# Patient Record
Sex: FEMALE | Race: WHITE | NOT HISPANIC OR LATINO
[De-identification: names, ages, dates, MRNs, and addresses within clinical notes are randomized per-mention and may not be internally consistent; named-entity substitution may affect disease eponyms.]

---

## 2019-08-26 ENCOUNTER — TRANSCRIPTION ENCOUNTER (OUTPATIENT)
Age: 74
End: 2019-08-26

## 2019-08-27 ENCOUNTER — OUTPATIENT (OUTPATIENT)
Dept: OUTPATIENT SERVICES | Facility: HOSPITAL | Age: 74
LOS: 1 days | End: 2019-08-27
Payer: MEDICARE

## 2019-08-27 VITALS
RESPIRATION RATE: 20 BRPM | OXYGEN SATURATION: 99 % | SYSTOLIC BLOOD PRESSURE: 154 MMHG | HEART RATE: 87 BPM | DIASTOLIC BLOOD PRESSURE: 78 MMHG

## 2019-08-27 VITALS
TEMPERATURE: 98 F | DIASTOLIC BLOOD PRESSURE: 84 MMHG | HEIGHT: 64.5 IN | SYSTOLIC BLOOD PRESSURE: 161 MMHG | OXYGEN SATURATION: 98 % | HEART RATE: 66 BPM | RESPIRATION RATE: 20 BRPM | WEIGHT: 153.22 LBS

## 2019-08-27 DIAGNOSIS — H25.12 AGE-RELATED NUCLEAR CATARACT, LEFT EYE: ICD-10-CM

## 2019-08-27 DIAGNOSIS — Z98.890 OTHER SPECIFIED POSTPROCEDURAL STATES: Chronic | ICD-10-CM

## 2019-08-27 DIAGNOSIS — Z90.49 ACQUIRED ABSENCE OF OTHER SPECIFIED PARTS OF DIGESTIVE TRACT: Chronic | ICD-10-CM

## 2019-08-27 DIAGNOSIS — Z87.74 PERSONAL HISTORY OF (CORRECTED) CONGENITAL MALFORMATIONS OF HEART AND CIRCULATORY SYSTEM: Chronic | ICD-10-CM

## 2019-08-27 DIAGNOSIS — Z86.018 PERSONAL HISTORY OF OTHER BENIGN NEOPLASM: Chronic | ICD-10-CM

## 2019-08-27 PROCEDURE — V2788: CPT

## 2019-08-27 PROCEDURE — 66984 XCAPSL CTRC RMVL W/O ECP: CPT | Mod: LT

## 2019-08-27 NOTE — ASU DISCHARGE PLAN (ADULT/PEDIATRIC) - CARE PROVIDER_API CALL
Shaan Hope)  Ophthalmology  [address] [place] [address]  Phone: [phone]  Fax: [phone]  Follow Up Time:

## 2019-08-27 NOTE — ASU DISCHARGE PLAN (ADULT/PEDIATRIC) - PATIENT EDUCATION MATERIALS PROVIED
Other (specify)/Implant card (specify)/Intraocular lens implant(IOL), Eye shield with instructions , sunglasses and eye kit given to patient.

## 2019-08-27 NOTE — ASU DISCHARGE PLAN (ADULT/PEDIATRIC) - CALL YOUR DOCTOR IF YOU HAVE ANY OF THE FOLLOWING:
Fever greater than (need to indicate Fahrenheit or Celsius)/Swelling that gets worse/Bleeding that does not stop/Pain not relieved by Medications/Nausea and vomiting that does not stop

## 2019-08-27 NOTE — ASU PATIENT PROFILE, ADULT - PSH
H/O atrial septal defect repair    H/O cardiac catheterization    History of benign breast tumor    History of liver excision    S/P thyroid biopsy

## 2019-09-06 PROBLEM — E78.5 HYPERLIPIDEMIA, UNSPECIFIED: Chronic | Status: ACTIVE | Noted: 2019-08-27

## 2019-09-06 PROBLEM — I10 ESSENTIAL (PRIMARY) HYPERTENSION: Chronic | Status: ACTIVE | Noted: 2019-08-27

## 2019-09-06 PROBLEM — D37.6 NEOPLASM OF UNCERTAIN BEHAVIOR OF LIVER, GALLBLADDER AND BILE DUCTS: Chronic | Status: ACTIVE | Noted: 2019-08-27

## 2019-09-09 ENCOUNTER — TRANSCRIPTION ENCOUNTER (OUTPATIENT)
Age: 74
End: 2019-09-09

## 2019-09-10 ENCOUNTER — OUTPATIENT (OUTPATIENT)
Dept: OUTPATIENT SERVICES | Facility: HOSPITAL | Age: 74
LOS: 1 days | End: 2019-09-10
Payer: MEDICARE

## 2019-09-10 VITALS
TEMPERATURE: 98 F | HEART RATE: 60 BPM | DIASTOLIC BLOOD PRESSURE: 76 MMHG | HEIGHT: 64 IN | OXYGEN SATURATION: 96 % | SYSTOLIC BLOOD PRESSURE: 143 MMHG | WEIGHT: 152.12 LBS | RESPIRATION RATE: 25 BRPM

## 2019-09-10 VITALS
DIASTOLIC BLOOD PRESSURE: 66 MMHG | OXYGEN SATURATION: 96 % | RESPIRATION RATE: 16 BRPM | SYSTOLIC BLOOD PRESSURE: 139 MMHG | HEART RATE: 71 BPM

## 2019-09-10 DIAGNOSIS — Z98.890 OTHER SPECIFIED POSTPROCEDURAL STATES: Chronic | ICD-10-CM

## 2019-09-10 DIAGNOSIS — Z86.018 PERSONAL HISTORY OF OTHER BENIGN NEOPLASM: Chronic | ICD-10-CM

## 2019-09-10 DIAGNOSIS — Z90.49 ACQUIRED ABSENCE OF OTHER SPECIFIED PARTS OF DIGESTIVE TRACT: Chronic | ICD-10-CM

## 2019-09-10 DIAGNOSIS — Z98.42 CATARACT EXTRACTION STATUS, LEFT EYE: Chronic | ICD-10-CM

## 2019-09-10 DIAGNOSIS — Z87.74 PERSONAL HISTORY OF (CORRECTED) CONGENITAL MALFORMATIONS OF HEART AND CIRCULATORY SYSTEM: Chronic | ICD-10-CM

## 2019-09-10 DIAGNOSIS — H25.11 AGE-RELATED NUCLEAR CATARACT, RIGHT EYE: ICD-10-CM

## 2019-09-10 PROCEDURE — 66984 XCAPSL CTRC RMVL W/O ECP: CPT | Mod: RT

## 2019-09-10 PROCEDURE — V2788: CPT

## 2019-09-10 NOTE — ASU DISCHARGE PLAN (ADULT/PEDIATRIC) - CONDITION AT DISCHARGE
PATIENT TOLERATED PROCEDURE WELL. PT COMPLAINS OF  0/10 PAIN. ASSISTED PATIENT UP FOR FIRST TIME. STEADY ON FEET AND DISCHARGE INSTRUCTIONS GIVEN.  
Stable

## 2019-09-10 NOTE — ASU DISCHARGE PLAN (ADULT/PEDIATRIC) - CARE PROVIDER_API CALL
Shaan Hope)  Ophthalmology  [address] [place] [address]  Phone: [phone]  Fax: [phone]  Follow Up Time:

## 2019-09-10 NOTE — ASU PATIENT PROFILE, ADULT - PSH
H/O atrial septal defect repair    H/O cardiac catheterization    History of benign breast tumor    History of liver excision    S/P left cataract extraction    S/P thyroid biopsy

## 2022-01-01 NOTE — ASU DISCHARGE PLAN (ADULT/PEDIATRIC) - ASU DC SPECIAL INSTRUCTIONSFT
do not rub eye. tylenol for discomfort.  avoid advil motrin aleve aspirin to decrease chance of bleeding. eye kit given to pt.  Discharge and follow up  instructions explained to pt.  pt understands proper use of eye drops and instructions. f/u tomorrow@ 345pm 53

## 2022-03-03 NOTE — ASU PREOP CHECKLIST - STERILIZATION AFFIRMATION
[facility] - Labor & Delivery  Vaginal Delivery   Operative Note    SUMMARY     Normal spontaneous vaginal delivery of live infant, was placed on mothers abdomen for skin to skin and bulb suctioning performed.  Infant delivered position OA over intact perineum.  Nuchal cord: Yes, cord reduced following delivery.    Spontaneous delivery of placenta and IV pitocin given noting good uterine tone.  2nd degree laceration noted and repaired in normal fashion with 2-0 vicryl on a CT.  Patient tolerated delivery well. Sponge needle and lap counted correctly x2.    Indications:  (spontaneous vaginal delivery)  Pregnancy complicated by:   Patient Active Problem List   Diagnosis    Primigravida of advanced maternal age in second trimester    Decreased strength of trunk and back    Multigravida of advanced maternal age in third trimester    Encounter for supervision of normal first pregnancy in third trimester     (spontaneous vaginal delivery)     Admitting GA: 40w1d    Delivery Information for Carlos Platt    Birth information:  YOB: 2020   Time of birth: 4:05 AM   Sex: female   Head Delivery Date/Time: 3/3/2022  4:05 AM   Delivery type: Vaginal, Spontaneous   Gestational Age: 40w1d    Delivery Providers    Delivering clinician: MARIA C Barrientos MD   Provider Role    MD Isabel Aguero MD Bailey King, GERBER Weeks RN             Measurements    Weight:   Length:          Apgars    Living status: Living  Apgars:  1 min.:  5 min.:  10 min.:  15 min.:  20 min.:    Skin color:  1  1       Heart rate:  2  2       Reflex irritability:  2  2       Muscle tone:  2  2       Respiratory effort:  2  2       Total:  9  9       Apgars assigned by: MARIA C SEGURA RN         Operative Delivery    Forceps attempted?: No  Vacuum extractor attempted?: No         Shoulder Dystocia    Shoulder dystocia present?: No           Presentation    Presentation:  Vertex  Position: Middle Occiput Anterior           Interventions/Resuscitation    Method: Bulb Suctioning, Tactile Stimulation       Cord    Vessels: 3 vessels  Complications: Nuchal  Nuchal Intervention: reduced  Nuchal Cord Description: loose nuchal cord  Number of Loops: 1  Delayed Cord Clamping?: Yes  Cord Clamped Date/Time: 3/3/2022  4:06 AM  Cord Blood Disposition: Sent with Baby  Gases Sent?: No  Stem Cell Collection (by MD): No       Placenta    Placenta delivery date/time: 3/3/2022 0409  Placenta removal: Spontaneous  Placenta appearance: Intact  Placenta disposition: discarded           Labor Events:       labor: No     Labor Onset Date/Time:         Dilation Complete Date/Time: 2022 03:45     Start Pushing Date/Time: 2022 04:00       Start Pushing Date/Time: 2022 04:00     Rupture Date/Time:            Rupture type:          Fluid Amount:       Fluid Color:       Fluid Odor:       Membrane Status:                steroids:       Antibiotics given for GBS:       Induction:       Indications for induction:        Augmentation: oxytocin;amniotomy     Indications for augmentation: Ineffective Contraction Pattern     Labor complications: None     Additional complications:          Cervical ripening:                     Delivery:      Episiotomy: None     Indication for Episiotomy:       Perineal Lacerations: 2nd Repaired:  Yes   Periurethral Laceration:   Repaired:     Labial Laceration:   Repaired:     Sulcus Laceration:   Repaired:     Vaginal Laceration:   Repaired:     Cervical Laceration:   Repaired:     Repair suture:       Repair # of packets: 1     Last Value - EBL - Nursing (mL):       Sum - EBL - Nursing (mL): 0     Last Value - EBL - Anesthesia (mL):      Calculated QBL (mL):       Vaginal Sweep Performed: Yes     Surgicount Correct: Yes       Other providers:       Anesthesia    Method: Epidural          Details (if applicable):  Trial of Labor       Categorization:      Priority:     Indications for :     Incision Type:       Additional  information:  Forceps:    Vacuum:    Breech:    Observed anomalies    Other (Comments):         Concetta Bronson MD  [facility] PGY-1     n/a

## 2022-05-06 NOTE — ASU DISCHARGE PLAN (ADULT/PEDIATRIC) - PATIENT BELONGINGS
Patient's belongings returned
FREE:[LAST:[YOUR PMD],PHONE:[(   )    -],FAX:[(   )    -],FOLLOWUP:[1-3 Days]]

## 2023-01-16 PROBLEM — Z00.00 ENCOUNTER FOR PREVENTIVE HEALTH EXAMINATION: Status: ACTIVE | Noted: 2023-01-16

## 2023-01-17 ENCOUNTER — APPOINTMENT (OUTPATIENT)
Dept: ORTHOPEDIC SURGERY | Facility: CLINIC | Age: 78
End: 2023-01-17
Payer: MEDICARE

## 2023-01-17 VITALS — WEIGHT: 149 LBS | HEIGHT: 65 IN | BODY MASS INDEX: 24.83 KG/M2

## 2023-01-17 DIAGNOSIS — E78.00 PURE HYPERCHOLESTEROLEMIA, UNSPECIFIED: ICD-10-CM

## 2023-01-17 DIAGNOSIS — M54.2 CERVICALGIA: ICD-10-CM

## 2023-01-17 DIAGNOSIS — I10 ESSENTIAL (PRIMARY) HYPERTENSION: ICD-10-CM

## 2023-01-17 DIAGNOSIS — Z78.9 OTHER SPECIFIED HEALTH STATUS: ICD-10-CM

## 2023-01-17 PROCEDURE — 99214 OFFICE O/P EST MOD 30 MIN: CPT

## 2023-01-17 PROCEDURE — 72040 X-RAY EXAM NECK SPINE 2-3 VW: CPT

## 2023-01-17 RX ORDER — METHYLPREDNISOLONE 4 MG/1
4 TABLET ORAL
Qty: 1 | Refills: 0 | Status: ACTIVE | COMMUNITY
Start: 2023-01-17 | End: 1900-01-01

## 2023-01-17 NOTE — ASSESSMENT
[FreeTextEntry1] : will start her on medrol robaxin and therapy as that has helped in the past f/u 4 weeks

## 2023-01-17 NOTE — PHYSICAL EXAM
[Flexion] : flexion [Extension] : extension [Rotation to left] : rotation to left [Rotation to right] : rotation to right [] : negative Spurling

## 2023-01-17 NOTE — HISTORY OF PRESENT ILLNESS
[9] : 9 [3] : 3 [Dull/Aching] : dull/aching [Localized] : localized [Sleep] : sleep [Nothing helps with pain getting better] : Nothing helps with pain getting better [de-identified] : 1-17-23- Known to have been seen in years past with cervical ddd did well with meds and therapy. Notes over the last week pain and limited range of motion neck. denies radicular complaints. denies known injury. may have been exacerbated from bowling as she is in a one time per week league.  [] : no [FreeTextEntry5] : KARINAMATTHEW MENDIOLA is a returning presenting with neck pain with headaches that started 1 week ago. Pt was previously seen in 01/2022.  [de-identified] : movement

## 2023-01-18 RX ORDER — TIZANIDINE 4 MG/1
4 TABLET ORAL
Qty: 20 | Refills: 0 | Status: ACTIVE | COMMUNITY
Start: 2023-01-18 | End: 1900-01-01

## 2023-01-18 RX ORDER — METHOCARBAMOL 750 MG/1
750 TABLET, FILM COATED ORAL TWICE DAILY
Qty: 60 | Refills: 0 | Status: DISCONTINUED | COMMUNITY
Start: 2023-01-17 | End: 2023-01-18

## 2023-01-24 ENCOUNTER — APPOINTMENT (OUTPATIENT)
Dept: MRI IMAGING | Facility: CLINIC | Age: 78
End: 2023-01-24
Payer: MEDICARE

## 2023-01-24 PROCEDURE — 72141 MRI NECK SPINE W/O DYE: CPT | Mod: MH

## 2023-02-16 ENCOUNTER — APPOINTMENT (OUTPATIENT)
Dept: ORTHOPEDIC SURGERY | Facility: CLINIC | Age: 78
End: 2023-02-16
Payer: MEDICARE

## 2023-02-16 VITALS — WEIGHT: 149 LBS | HEIGHT: 65 IN | BODY MASS INDEX: 24.83 KG/M2

## 2023-02-16 DIAGNOSIS — M47.892 OTHER SPONDYLOSIS, CERVICAL REGION: ICD-10-CM

## 2023-02-16 PROCEDURE — 99214 OFFICE O/P EST MOD 30 MIN: CPT

## 2023-02-16 NOTE — DATA REVIEWED
[MRI] : MRI [Cervical Spine] : cervical spine [Report was reviewed and noted in the chart] : The report was reviewed and noted in the chart [I independently reviewed and interpreted images and report] : I independently reviewed and interpreted images and report [I reviewed the films/CD and additionally noted] : I reviewed the films/CD and additionally noted [FreeTextEntry1] : 1. Scoliosis, exaggerated cervical lordosis, and multilevel anterolisthesis with multilevel degenerative disc \par disease.\par 2. Impingement upon bilateral exiting C6 nerve roots at C5-C6 and right greater than left exiting C7 nerve roots \par at C6-C7 with multilevel foraminal narrowing.\par 3. Multilevel uncovertebral degenerative changes and degenerative changes in the upper thoracic spine.\par 4. No acute fracture or cord compression.

## 2023-02-16 NOTE — ASSESSMENT
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 She will start PT to work on ROM and strengthening. Also referred to spine center for follow up. Discussed massage vs acupuncture vs chiro care. she is prn as this will unlikely become a surgical issue.

## 2023-02-16 NOTE — HISTORY OF PRESENT ILLNESS
[Neck] : neck [9] : 9 [3] : 3 [Dull/Aching] : dull/aching [Localized] : localized [Sleep] : sleep [Nothing helps with pain getting better] : Nothing helps with pain getting better [de-identified] : 2-16-23- Patient presents for MRI follow up cervical spine pain. Known to have been seen in years past with cervical ddd did well with meds and therapy. Notes over the last week pain and limited range of motion neck. denies radicular complaints. denies known injury. she presents for mri review. [] : no [FreeTextEntry5] : Pt here for MRI review of C Spine. No changes since last visit. [de-identified] : movement

## 2023-03-27 ENCOUNTER — APPOINTMENT (OUTPATIENT)
Dept: ORTHOPEDIC SURGERY | Facility: CLINIC | Age: 78
End: 2023-03-27

## 2023-12-27 ENCOUNTER — APPOINTMENT (OUTPATIENT)
Dept: RADIOLOGY | Facility: CLINIC | Age: 78
End: 2023-12-27

## 2023-12-27 ENCOUNTER — OUTPATIENT (OUTPATIENT)
Dept: OUTPATIENT SERVICES | Facility: HOSPITAL | Age: 78
LOS: 1 days | End: 2023-12-27
Payer: MEDICARE

## 2023-12-27 DIAGNOSIS — Z90.49 ACQUIRED ABSENCE OF OTHER SPECIFIED PARTS OF DIGESTIVE TRACT: Chronic | ICD-10-CM

## 2023-12-27 DIAGNOSIS — Z00.8 ENCOUNTER FOR OTHER GENERAL EXAMINATION: ICD-10-CM

## 2023-12-27 DIAGNOSIS — Z98.890 OTHER SPECIFIED POSTPROCEDURAL STATES: Chronic | ICD-10-CM

## 2023-12-27 DIAGNOSIS — Z87.74 PERSONAL HISTORY OF (CORRECTED) CONGENITAL MALFORMATIONS OF HEART AND CIRCULATORY SYSTEM: Chronic | ICD-10-CM

## 2023-12-27 DIAGNOSIS — M85.89 OTHER SPECIFIED DISORDERS OF BONE DENSITY AND STRUCTURE, MULTIPLE SITES: ICD-10-CM

## 2023-12-27 DIAGNOSIS — Z86.018 PERSONAL HISTORY OF OTHER BENIGN NEOPLASM: Chronic | ICD-10-CM

## 2023-12-27 DIAGNOSIS — Z98.42 CATARACT EXTRACTION STATUS, LEFT EYE: Chronic | ICD-10-CM

## 2023-12-27 PROCEDURE — 77080 DXA BONE DENSITY AXIAL: CPT

## 2023-12-27 PROCEDURE — 77080 DXA BONE DENSITY AXIAL: CPT | Mod: 26

## 2024-07-10 ENCOUNTER — APPOINTMENT (OUTPATIENT)
Dept: ORTHOPEDIC SURGERY | Facility: CLINIC | Age: 79
End: 2024-07-10

## 2024-11-01 ENCOUNTER — APPOINTMENT (OUTPATIENT)
Dept: ORTHOPEDIC SURGERY | Facility: CLINIC | Age: 79
End: 2024-11-01
Payer: MEDICARE

## 2024-11-01 VITALS — HEIGHT: 65 IN | BODY MASS INDEX: 23.32 KG/M2 | WEIGHT: 140 LBS

## 2024-11-01 DIAGNOSIS — S83.91XA SPRAIN OF UNSPECIFIED SITE OF RIGHT KNEE, INITIAL ENCOUNTER: ICD-10-CM

## 2024-11-01 DIAGNOSIS — G89.29 PAIN IN LEFT KNEE: ICD-10-CM

## 2024-11-01 DIAGNOSIS — M25.569 PAIN IN UNSPECIFIED KNEE: ICD-10-CM

## 2024-11-01 DIAGNOSIS — M25.562 PAIN IN LEFT KNEE: ICD-10-CM

## 2024-11-01 PROCEDURE — 99214 OFFICE O/P EST MOD 30 MIN: CPT

## 2024-11-01 PROCEDURE — L1833: CPT | Mod: KX,RT

## 2024-11-01 PROCEDURE — 73562 X-RAY EXAM OF KNEE 3: CPT | Mod: RT

## 2024-11-01 RX ORDER — METHYLPREDNISOLONE 4 MG/1
4 TABLET ORAL
Qty: 1 | Refills: 0 | Status: ACTIVE | COMMUNITY
Start: 2024-11-01 | End: 1900-01-01

## 2024-11-08 ENCOUNTER — APPOINTMENT (OUTPATIENT)
Dept: MRI IMAGING | Facility: CLINIC | Age: 79
End: 2024-11-08
Payer: MEDICARE

## 2024-11-08 ENCOUNTER — APPOINTMENT (OUTPATIENT)
Dept: ORTHOPEDIC SURGERY | Facility: CLINIC | Age: 79
End: 2024-11-08

## 2024-11-08 PROCEDURE — 73721 MRI JNT OF LWR EXTRE W/O DYE: CPT | Mod: RT,MH

## 2024-11-15 ENCOUNTER — APPOINTMENT (OUTPATIENT)
Dept: ORTHOPEDIC SURGERY | Facility: CLINIC | Age: 79
End: 2024-11-15
Payer: MEDICARE

## 2024-11-15 DIAGNOSIS — S83.241A OTHER TEAR OF MEDIAL MENISCUS, CURRENT INJURY, RIGHT KNEE, INITIAL ENCOUNTER: ICD-10-CM

## 2024-11-15 DIAGNOSIS — S83.91XA SPRAIN OF UNSPECIFIED SITE OF RIGHT KNEE, INITIAL ENCOUNTER: ICD-10-CM

## 2024-11-15 PROCEDURE — 99214 OFFICE O/P EST MOD 30 MIN: CPT

## 2024-12-21 ENCOUNTER — APPOINTMENT (OUTPATIENT)
Dept: MRI IMAGING | Facility: CLINIC | Age: 79
End: 2024-12-21

## 2024-12-21 ENCOUNTER — OUTPATIENT (OUTPATIENT)
Dept: OUTPATIENT SERVICES | Facility: HOSPITAL | Age: 79
LOS: 1 days | End: 2024-12-21
Payer: COMMERCIAL

## 2024-12-21 DIAGNOSIS — Z98.42 CATARACT EXTRACTION STATUS, LEFT EYE: Chronic | ICD-10-CM

## 2024-12-21 DIAGNOSIS — Z00.00 ENCOUNTER FOR GENERAL ADULT MEDICAL EXAMINATION WITHOUT ABNORMAL FINDINGS: ICD-10-CM

## 2024-12-21 DIAGNOSIS — Z87.74 PERSONAL HISTORY OF (CORRECTED) CONGENITAL MALFORMATIONS OF HEART AND CIRCULATORY SYSTEM: Chronic | ICD-10-CM

## 2024-12-21 DIAGNOSIS — Z86.018 PERSONAL HISTORY OF OTHER BENIGN NEOPLASM: Chronic | ICD-10-CM

## 2024-12-21 DIAGNOSIS — Z98.890 OTHER SPECIFIED POSTPROCEDURAL STATES: Chronic | ICD-10-CM

## 2024-12-21 DIAGNOSIS — Z90.49 ACQUIRED ABSENCE OF OTHER SPECIFIED PARTS OF DIGESTIVE TRACT: Chronic | ICD-10-CM

## 2024-12-21 PROCEDURE — A9581: CPT

## 2024-12-21 PROCEDURE — 74182 MRI ABDOMEN W/CONTRAST: CPT | Mod: MH

## 2024-12-21 PROCEDURE — 74182 MRI ABDOMEN W/CONTRAST: CPT | Mod: 26,MH

## 2025-01-03 ENCOUNTER — APPOINTMENT (OUTPATIENT)
Dept: ORTHOPEDIC SURGERY | Facility: CLINIC | Age: 80
End: 2025-01-03
Payer: MEDICARE

## 2025-01-03 DIAGNOSIS — S83.241A OTHER TEAR OF MEDIAL MENISCUS, CURRENT INJURY, RIGHT KNEE, INITIAL ENCOUNTER: ICD-10-CM

## 2025-01-03 DIAGNOSIS — S83.249A OTHER TEAR OF MEDIAL MENISCUS, CURRENT INJURY, UNSPECIFIED KNEE, INITIAL ENCOUNTER: ICD-10-CM

## 2025-01-03 PROCEDURE — 99213 OFFICE O/P EST LOW 20 MIN: CPT

## 2025-03-11 ENCOUNTER — APPOINTMENT (OUTPATIENT)
Dept: ORTHOPEDIC SURGERY | Facility: CLINIC | Age: 80
End: 2025-03-11
Payer: MEDICARE

## 2025-03-11 DIAGNOSIS — S83.91XA SPRAIN OF UNSPECIFIED SITE OF RIGHT KNEE, INITIAL ENCOUNTER: ICD-10-CM

## 2025-03-11 PROCEDURE — 99214 OFFICE O/P EST MOD 30 MIN: CPT

## 2025-03-14 ENCOUNTER — APPOINTMENT (OUTPATIENT)
Dept: ORTHOPEDIC SURGERY | Facility: CLINIC | Age: 80
End: 2025-03-14
Payer: MEDICARE

## 2025-03-14 DIAGNOSIS — S83.249A OTHER TEAR OF MEDIAL MENISCUS, CURRENT INJURY, UNSPECIFIED KNEE, INITIAL ENCOUNTER: ICD-10-CM

## 2025-03-14 DIAGNOSIS — S83.91XD SPRAIN OF UNSPECIFIED SITE OF RIGHT KNEE, SUBSEQUENT ENCOUNTER: ICD-10-CM

## 2025-03-14 DIAGNOSIS — M17.11 UNILATERAL PRIMARY OSTEOARTHRITIS, RIGHT KNEE: ICD-10-CM

## 2025-03-14 PROCEDURE — 20610 DRAIN/INJ JOINT/BURSA W/O US: CPT | Mod: RT

## 2025-03-14 PROCEDURE — J3490M: CUSTOM | Mod: NC,JZ

## 2025-03-14 PROCEDURE — 99214 OFFICE O/P EST MOD 30 MIN: CPT | Mod: 25

## 2025-05-27 ENCOUNTER — OUTPATIENT (OUTPATIENT)
Dept: OUTPATIENT SERVICES | Facility: HOSPITAL | Age: 80
LOS: 1 days | End: 2025-05-27
Payer: MEDICARE

## 2025-05-27 ENCOUNTER — APPOINTMENT (OUTPATIENT)
Dept: MRI IMAGING | Facility: CLINIC | Age: 80
End: 2025-05-27
Payer: MEDICARE

## 2025-05-27 DIAGNOSIS — Z98.890 OTHER SPECIFIED POSTPROCEDURAL STATES: Chronic | ICD-10-CM

## 2025-05-27 DIAGNOSIS — Z86.018 PERSONAL HISTORY OF OTHER BENIGN NEOPLASM: Chronic | ICD-10-CM

## 2025-05-27 DIAGNOSIS — Z00.00 ENCOUNTER FOR GENERAL ADULT MEDICAL EXAMINATION WITHOUT ABNORMAL FINDINGS: ICD-10-CM

## 2025-05-27 DIAGNOSIS — Z98.42 CATARACT EXTRACTION STATUS, LEFT EYE: Chronic | ICD-10-CM

## 2025-05-27 DIAGNOSIS — Z90.49 ACQUIRED ABSENCE OF OTHER SPECIFIED PARTS OF DIGESTIVE TRACT: Chronic | ICD-10-CM

## 2025-05-27 DIAGNOSIS — Z87.74 PERSONAL HISTORY OF (CORRECTED) CONGENITAL MALFORMATIONS OF HEART AND CIRCULATORY SYSTEM: Chronic | ICD-10-CM

## 2025-05-27 PROCEDURE — A9585: CPT

## 2025-05-27 PROCEDURE — 74183 MRI ABD W/O CNTR FLWD CNTR: CPT | Mod: 26

## 2025-05-27 PROCEDURE — 74183 MRI ABD W/O CNTR FLWD CNTR: CPT
